# Patient Record
Sex: FEMALE | Race: WHITE | Employment: UNEMPLOYED | URBAN - METROPOLITAN AREA
[De-identification: names, ages, dates, MRNs, and addresses within clinical notes are randomized per-mention and may not be internally consistent; named-entity substitution may affect disease eponyms.]

---

## 2019-05-17 NOTE — PROGRESS NOTES
Patient not reached. Preop instructions left on voice mail. Number___[phone]______      DATE__6/11/19______ TIME__0830______ARRIVAL___FEC  0700______      Nothing to eat or drink after midnight the night before,except for what the prep instructions call for. If you do not have the instructions or do not understand them please contact your doctors office. Follow any instructions your doctors office has given you including what medications to take the AM of your procedure and which ones to hold. You may use your inhalers. If you take a long acting insulin the ivis prior please cut the dose in half and take no diabetic medications that AM.Follow specific doctors office instructions regarding blood thinners and if they want you to hold and for how long. If you are on a blood thinner and have no instructions please contact the office and ask. Dress comfortably,bring your insurance card,picture ID,and a complete list of medications, including supplements. You must have a responsible adult to stay with you during the procedure,drive you home and stay with you. [facility] phone number [phone] for any questions.

## 2019-05-20 ENCOUNTER — ANESTHESIA EVENT (OUTPATIENT)
Dept: ENDOSCOPY | Age: 64
End: 2019-05-20
Payer: COMMERCIAL

## 2019-06-11 ENCOUNTER — HOSPITAL ENCOUNTER (OUTPATIENT)
Age: 64
Setting detail: OUTPATIENT SURGERY
Discharge: HOME | End: 2019-06-11
Attending: INTERNAL MEDICINE | Admitting: INTERNAL MEDICINE
Payer: COMMERCIAL

## 2019-06-11 ENCOUNTER — ANESTHESIA (OUTPATIENT)
Dept: ENDOSCOPY | Age: 64
End: 2019-06-11
Payer: COMMERCIAL

## 2019-06-11 VITALS
OXYGEN SATURATION: 100 % | WEIGHT: 143 LBS | BODY MASS INDEX: 22.98 KG/M2 | SYSTOLIC BLOOD PRESSURE: 145 MMHG | HEIGHT: 66 IN | DIASTOLIC BLOOD PRESSURE: 87 MMHG | HEART RATE: 70 BPM | TEMPERATURE: 97.6 F | RESPIRATION RATE: 16 BRPM

## 2019-06-11 VITALS — DIASTOLIC BLOOD PRESSURE: 88 MMHG | OXYGEN SATURATION: 98 % | SYSTOLIC BLOOD PRESSURE: 117 MMHG

## 2019-06-11 PROCEDURE — 3700000000 HC ANESTHESIA ATTENDED CARE: Performed by: INTERNAL MEDICINE

## 2019-06-11 PROCEDURE — 7100000011 HC PHASE II RECOVERY - ADDTL 15 MIN: Performed by: INTERNAL MEDICINE

## 2019-06-11 PROCEDURE — 2500000003 HC RX 250 WO HCPCS: Performed by: INTERNAL MEDICINE

## 2019-06-11 PROCEDURE — 2580000003 HC RX 258: Performed by: ANESTHESIOLOGY

## 2019-06-11 PROCEDURE — 6360000002 HC RX W HCPCS: Performed by: NURSE ANESTHETIST, CERTIFIED REGISTERED

## 2019-06-11 PROCEDURE — 3609012400 HC EGD TRANSORAL BIOPSY SINGLE/MULTIPLE: Performed by: INTERNAL MEDICINE

## 2019-06-11 PROCEDURE — 3609010600 HC COLONOSCOPY POLYPECTOMY SNARE/COLD BIOPSY: Performed by: INTERNAL MEDICINE

## 2019-06-11 PROCEDURE — 3700000001 HC ADD 15 MINUTES (ANESTHESIA): Performed by: INTERNAL MEDICINE

## 2019-06-11 PROCEDURE — 7100000010 HC PHASE II RECOVERY - FIRST 15 MIN: Performed by: INTERNAL MEDICINE

## 2019-06-11 PROCEDURE — 2500000003 HC RX 250 WO HCPCS: Performed by: NURSE ANESTHETIST, CERTIFIED REGISTERED

## 2019-06-11 PROCEDURE — 6370000000 HC RX 637 (ALT 250 FOR IP): Performed by: INTERNAL MEDICINE

## 2019-06-11 PROCEDURE — 2709999900 HC NON-CHARGEABLE SUPPLY: Performed by: INTERNAL MEDICINE

## 2019-06-11 RX ORDER — PROPOFOL 10 MG/ML
INJECTION, EMULSION INTRAVENOUS CONTINUOUS PRN
Status: DISCONTINUED | OUTPATIENT
Start: 2019-06-11 | End: 2019-06-11 | Stop reason: SDUPTHER

## 2019-06-11 RX ORDER — SODIUM CHLORIDE 9 MG/ML
INJECTION, SOLUTION INTRAVENOUS CONTINUOUS
Status: DISCONTINUED | OUTPATIENT
Start: 2019-06-11 | End: 2019-06-11 | Stop reason: HOSPADM

## 2019-06-11 RX ORDER — LIDOCAINE HYDROCHLORIDE 20 MG/ML
INJECTION, SOLUTION INFILTRATION; PERINEURAL PRN
Status: DISCONTINUED | OUTPATIENT
Start: 2019-06-11 | End: 2019-06-11 | Stop reason: SDUPTHER

## 2019-06-11 RX ORDER — SODIUM CHLORIDE 0.9 % (FLUSH) 0.9 %
10 SYRINGE (ML) INJECTION PRN
Status: DISCONTINUED | OUTPATIENT
Start: 2019-06-11 | End: 2019-06-11 | Stop reason: HOSPADM

## 2019-06-11 RX ORDER — LIDOCAINE HYDROCHLORIDE 10 MG/ML
1 INJECTION, SOLUTION EPIDURAL; INFILTRATION; INTRACAUDAL; PERINEURAL
Status: DISCONTINUED | OUTPATIENT
Start: 2019-06-11 | End: 2019-06-11 | Stop reason: HOSPADM

## 2019-06-11 RX ORDER — FUROSEMIDE 20 MG/1
20 TABLET ORAL DAILY
COMMUNITY

## 2019-06-11 RX ORDER — SODIUM CHLORIDE 0.9 % (FLUSH) 0.9 %
10 SYRINGE (ML) INJECTION EVERY 12 HOURS SCHEDULED
Status: DISCONTINUED | OUTPATIENT
Start: 2019-06-11 | End: 2019-06-11 | Stop reason: HOSPADM

## 2019-06-11 RX ORDER — PROPOFOL 10 MG/ML
INJECTION, EMULSION INTRAVENOUS PRN
Status: DISCONTINUED | OUTPATIENT
Start: 2019-06-11 | End: 2019-06-11 | Stop reason: SDUPTHER

## 2019-06-11 RX ADMIN — PROPOFOL 30 MG: 10 INJECTION, EMULSION INTRAVENOUS at 09:12

## 2019-06-11 RX ADMIN — SODIUM CHLORIDE: 9 INJECTION, SOLUTION INTRAVENOUS at 07:55

## 2019-06-11 RX ADMIN — LIDOCAINE HYDROCHLORIDE 60 MG: 20 INJECTION, SOLUTION INFILTRATION; PERINEURAL at 09:08

## 2019-06-11 RX ADMIN — LIDOCAINE HYDROCHLORIDE 15 MG: 20 INJECTION, SOLUTION INFILTRATION; PERINEURAL at 09:12

## 2019-06-11 RX ADMIN — PROPOFOL 30 MG: 10 INJECTION, EMULSION INTRAVENOUS at 09:46

## 2019-06-11 RX ADMIN — LIDOCAINE HYDROCHLORIDE 15 MG: 20 INJECTION, SOLUTION INFILTRATION; PERINEURAL at 09:46

## 2019-06-11 RX ADMIN — PROPOFOL 120 MG: 10 INJECTION, EMULSION INTRAVENOUS at 09:08

## 2019-06-11 RX ADMIN — PROPOFOL 120 MCG/KG/MIN: 10 INJECTION, EMULSION INTRAVENOUS at 09:11

## 2019-06-11 ASSESSMENT — PAIN - FUNCTIONAL ASSESSMENT: PAIN_FUNCTIONAL_ASSESSMENT: 0-10

## 2019-06-11 ASSESSMENT — PAIN SCALES - GENERAL: PAINLEVEL_OUTOF10: 0

## 2019-06-11 NOTE — PROGRESS NOTES
Reviewed patient's medical and surgical history in electronic record and with patient at the bedside. All questions regarding procedure answered. Scope number and equipment verified using a two person system. Family in waiting room.     Electronically signed by Cody Lopez RN on 6/11/2019 at 9:05 AM

## 2019-06-11 NOTE — PROGRESS NOTES
Patient discharged via wheelchair to vehicle with her son. Patient stated her son is staying with her today.

## 2019-06-11 NOTE — PROGRESS NOTES
Post-procedure education reviewed with patient and visitor, and they verbalized understanding. Patient voided clear urine 1000 cc. States she feels better.

## 2019-06-11 NOTE — PROGRESS NOTES
CR capsule,   temazepam (RESTORIL) 30 MG capsule,   ziprasidone (GEODON) 40 MG capsule,   selegiline (EMSAM) 9 MG/24HR, Place 1 patch onto the skin daily. 12 mg  daily  lovastatin (MEVACOR) 20 MG tablet, Take 20 mg by mouth nightly. lisinopril (PRINIVIL;ZESTRIL) 40 MG tablet, Take 20 mg by mouth daily. furosemide (LASIX) 40 MG tablet, Take 40 mg by mouth daily   lansoprazole (PREVACID) 30 MG capsule, Take 30 mg by mouth daily. ziprasidone (GEODON) 20 MG capsule, Take 40 mg by mouth nightly   TEMAZEPAM PO, Take  by mouth. Allergies:  Amlodipine besylate and Nsaids    Social History:  Social History     Socioeconomic History    Marital status:      Spouse name: Not on file    Number of children: Not on file    Years of education: Not on file    Highest education level: Not on file   Occupational History    Not on file   Social Needs    Financial resource strain: Not on file    Food insecurity:     Worry: Not on file     Inability: Not on file    Transportation needs:     Medical: Not on file     Non-medical: Not on file   Tobacco Use    Smoking status: Former Smoker     Packs/day: 0.25     Years: 5.00     Pack years: 1.25     Last attempt to quit: 1980     Years since quittin.7    Smokeless tobacco: Never Used   Substance and Sexual Activity    Alcohol use:  Yes     Alcohol/week: 0.0 oz     Comment: occasionally    Drug use: Yes     Types: Marijuana     Comment: last used     Sexual activity: Never   Lifestyle    Physical activity:     Days per week: Not on file     Minutes per session: Not on file    Stress: Not on file   Relationships    Social connections:     Talks on phone: Not on file     Gets together: Not on file     Attends Zoroastrianism service: Not on file     Active member of club or organization: Not on file     Attends meetings of clubs or organizations: Not on file     Relationship status: Not on file    Intimate partner violence:     Fear of current or ex partner: Not on file     Emotionally abused: Not on file     Physically abused: Not on file     Forced sexual activity: Not on file   Other Topics Concern    Not on file   Social History Narrative    Not on file       Family History:      Problem Relation Age of Onset    Depression Mother     Heart Disease Mother    Blair Ruiz / Sadi Mother     Cancer Father     High Blood Pressure Father     Cancer Sister     Depression Sister     Substance Abuse Sister     Diabetes Brother        Vital Signs:  Vitals:    06/11/19 0731   BP: (!) 156/99   Pulse: 68   Resp: 16   Temp: 97.7 °F (36.5 °C)   SpO2: 100%

## 2019-06-11 NOTE — ANESTHESIA PRE PROCEDURE
Department of Anesthesiology  Preprocedure Note       Name:  Yas Herzog   Age:  66 y.o.  :  1955                                          MRN:  [id number]         Date:  2019      Surgeon: Romana Fraga):  Ginna Carpio MD    Procedure: COLONOSCOPY (N/A )  EGD (N/A Abdomen)    Medications prior to admission:   Prior to Admission medications    Medication Sig Start Date End Date Taking? Authorizing Provider   acetaminophen-codeine (TYLENOL #4) 300-60 MG per tablet  16   Historical Provider, MD   NUVIGIL 150 MG TABS tablet  5/10/16   Historical Provider, MD   ESTRACE VAGINAL 0.1 MG/GM vaginal cream  16   Historical Provider, MD   L-methylfolate Calcium 15 MG TABS  16   Historical Provider, MD   lamoTRIgine (LAMICTAL) 200 MG tablet  5/10/16   Historical Provider, MD   lisinopril (PRINIVIL;ZESTRIL) 20 MG tablet  16   Historical Provider, MD   [address] [place] [address]  16   Historical Provider, MD   potassium chloride (MICRO-K) 10 MEQ CR capsule  16   Historical Provider, MD   propranolol (INDERAL LA) 120 MG CR capsule  16   Historical Provider, MD   EMSAM 12 MG/24HR  16   Historical Provider, MD   temazepam (RESTORIL) 30 MG capsule  5/10/16   Historical Provider, MD   ziprasidone (GEODON) 40 MG capsule  5/10/16   Historical Provider, MD   Ospemifene (OSPHENA) 60 MG TABS Take 1 tablet by mouth daily    Historical Provider, MD   L-Methylfolate-Algae (DEPLIN 15 PO) Take  by mouth. Historical Provider, MD   selegiline (EMSAM) 9 MG/24HR Place 1 patch onto the skin daily. 12 mg  daily    Historical Provider, MD   lovastatin (MEVACOR) 20 MG tablet Take 20 mg by mouth nightly. Historical Provider, MD   lisinopril (PRINIVIL;ZESTRIL) 40 MG tablet Take 20 mg by mouth daily. Historical Provider, MD   furosemide (LASIX) 40 MG tablet Take 40 mg by mouth daily     Historical Provider, MD   propranolol (INDERAL LA) 120 MG CR capsule Take 120 mg by mouth daily. Historical Provider, MD   pramipexole (MIRAPEX) 0.5 MG tablet Take 0.5 mg by mouth nightly     Historical Provider, MD   lansoprazole (PREVACID) 30 MG capsule Take 30 mg by mouth daily. Historical Provider, MD   ziprasidone (GEODON) 20 MG capsule Take 40 mg by mouth nightly     Historical Provider, MD   TEMAZEPAM PO Take  by mouth. Historical Provider, MD       Current medications:    No current facility-administered medications for this encounter. Allergies:     Allergies   Allergen Reactions    Amlodipine Besylate Other (See Comments)     Blistering sores around mouth and in nose  Blistering sores around mouth and in nose    Nsaids      Not allergic just shouldn't take due to CKD       Problem List:    Patient Active Problem List   Diagnosis Code    DDD (degenerative disc disease), lumbosacral M51.37    DDD (degenerative disc disease), thoracolumbar M51.35    Myofascial pain syndrome M79.18    Spondylolisthesis, cervical region M43.12    DDD (degenerative disc disease), cervical M50.30    Left shoulder pain M25.512       Past Medical History:        Diagnosis Date    Hernandez's esophageal ulceration     Cancer ([facility] [address]     right kidney    Chronic kidney disease     DDD (degenerative disc disease), lumbosacral 2011    Depression     Esophageal stricture     Hyperlipidemia     Hypertension     Restless legs syndrome        Past Surgical History:        Procedure Laterality Date    ABDOMEN SURGERY       SECTION      CHOLECYSTECTOMY, LAPAROSCOPIC  9/30/15    laparoscopic cholecystectomy with lysis of adhesions    COSMETIC SURGERY      abdominal plasty    ENDOMETRIAL ABLATION      ENDOSCOPY, COLON, DIAGNOSTIC      EYE SURGERY      EYE SURGERY Bilateral 10/29/2015    Upper and Lower Blephoraplasty    HERNIA REPAIR      surguical hernia after right neph. partial    LIPOSUCTION  10/829/2015    Liposuction abdoment, back and side    PARTIAL NEPHRECTOMY      right second to cancer    UPPER GASTROINTESTINAL ENDOSCOPY         Social History:    Social History     Tobacco Use    Smoking status: Former Smoker     Packs/day: 0.25     Years: 5.00     Pack years: 1.25     Last attempt to quit: 1980     Years since quittin.7    Smokeless tobacco: Never Used   Substance Use Topics    Alcohol use: Yes     Alcohol/week: 0.0 oz     Comment: occasionally                                Counseling given: Not Answered      Vital Signs (Current): There were no vitals filed for this visit. BP Readings from Last 3 Encounters:   16 (!) 85/60   16 132/87   16 132/78       NPO Status:                                                                                 BMI:   Wt Readings from Last 3 Encounters:   16 148 lb (67.1 kg)   16 150 lb (68 kg)   16 144 lb (65.3 kg)     There is no height or weight on file to calculate BMI.    CBC:   Lab Results   Component Value Date    WBC 3.6 2015    RBC 3.37 2015    HGB 11.2 2015    HCT 33.5 2015    MCV 99.4 2015    RDW 13.6 2015     2015       CMP:   Lab Results   Component Value Date     10/13/2016    K 4.8 10/13/2016     10/13/2016    CO2 29 10/13/2016    BUN 26 10/13/2016    CREATININE 1.1 10/13/2016    GFRAA >60 10/13/2016    LABGLOM 51 10/13/2016    GLUCOSE 118 10/13/2016    PROT 7.3 10/13/2016    CALCIUM 9.9 10/13/2016    BILITOT 0.3 10/13/2016    ALKPHOS 125 10/13/2016    AST 21 10/13/2016    ALT 27 10/13/2016       POC Tests: No results for input(s): POCGLU, POCNA, POCK, POCCL, POCBUN, POCHEMO, POCHCT in the last 72 hours.     Coags: No results found for: PROTIME, INR, APTT    HCG (If Applicable): No results found for: PREGTESTUR, PREGSERUM, HCG, HCGQUANT     ABGs: No results found for: PHART, PO2ART, ZVI1QVQ, PZC9YSV, BEART, V5JNMEHF     Type & Screen (If Applicable):  No results found for: LABABO, [facility]    Anesthesia Evaluation  Patient summary reviewed and Nursing notes reviewed  Airway: Mallampati: II  TM distance: >3 FB   Neck ROM: full  Mouth opening: > = 3 FB Dental:          Pulmonary:                              Cardiovascular:  Exercise tolerance: good (>4 METS),   (+) hypertension: moderate,                   Neuro/Psych:   (+) neuromuscular disease:, psychiatric history:depression/anxiety              ROS comment: Restless leg GI/Hepatic/Renal:   (+) PUD,           Endo/Other:                     Abdominal:           Vascular:                                        Anesthesia Plan      MAC     ASA 2       Induction: intravenous. MIPS: Postoperative opioids intended and Prophylactic antiemetics administered. Anesthetic plan and risks discussed with patient. Plan discussed with CRNA.     Attending anesthesiologist reviewed and agrees with Joann Howell MD   6/11/2019

## 2019-06-11 NOTE — ANESTHESIA POSTPROCEDURE EVALUATION
Department of Anesthesiology  Postprocedure Note    Patient: Aman Schaffer  MRN: [id number]  YOB: 1955  Date of evaluation: 6/11/2019  Time:  10:04 AM     Procedure Summary     Date:  06/11/19 Room / Location:  [facility] ENDO 01 / [facility] ENDOSCOPY    Anesthesia Start:  0903 Anesthesia Stop:  9539    Procedures:       COLONOSCOPY POLYPECTOMY SNARE/COLD BIOPSY (N/A )      EGD BIOPSY (N/A Abdomen) Diagnosis:       (Z12.11 - COLON CANCER SCREENING )      (K22.70 - HELM;S ESOPHAGUS)    Surgeon:  Bunny Hathaway MD Responsible Provider:  Leslie Berrios MD    Anesthesia Type:  MAC ASA Status:  2          Anesthesia Type: MAC    Birdie Phase I: Birdie Score: 10    Birdie Phase II: Birdie Score: 10    Last vitals: Reviewed and per EMR flowsheets.        Anesthesia Post Evaluation    Patient location during evaluation: PACU  Patient participation: complete - patient participated  Level of consciousness: awake and awake and alert  Pain score: 2  Airway patency: patent  Nausea & Vomiting: no vomiting  Complications: no  Cardiovascular status: blood pressure returned to baseline  Respiratory status: acceptable  Hydration status: euvolemic

## 2020-01-16 ENCOUNTER — OFFICE VISIT (OUTPATIENT)
Dept: SURGERY | Age: 65
End: 2020-01-16
Payer: COMMERCIAL

## 2020-01-16 VITALS
SYSTOLIC BLOOD PRESSURE: 109 MMHG | HEIGHT: 66 IN | BODY MASS INDEX: 24.59 KG/M2 | WEIGHT: 153 LBS | DIASTOLIC BLOOD PRESSURE: 76 MMHG

## 2020-01-16 PROCEDURE — 99243 OFF/OP CNSLTJ NEW/EST LOW 30: CPT | Performed by: SURGERY

## 2020-01-16 RX ORDER — DOCUSATE SODIUM 100 MG/1
100 CAPSULE, LIQUID FILLED ORAL
COMMUNITY
End: 2021-04-06 | Stop reason: ALTCHOICE

## 2020-01-16 RX ORDER — LAMOTRIGINE 150 MG/1
TABLET ORAL
COMMUNITY
Start: 2020-01-14

## 2020-01-16 RX ORDER — PROPRANOLOL HYDROCHLORIDE 80 MG/1
120 TABLET ORAL
COMMUNITY

## 2020-01-16 ASSESSMENT — ENCOUNTER SYMPTOMS
NAUSEA: 1
BLOOD IN STOOL: 1
BACK PAIN: 1
EYES NEGATIVE: 1
ABDOMINAL PAIN: 1
RESPIRATORY NEGATIVE: 1
VOICE CHANGE: 1
ALLERGIC/IMMUNOLOGIC NEGATIVE: 1
CONSTIPATION: 1
ABDOMINAL DISTENTION: 1

## 2020-01-16 NOTE — PROGRESS NOTES
(PREVACID) 30 MG capsule Take 30 mg by mouth daily. Historical Provider, MD   ziprasidone (GEODON) 20 MG capsule Take 40 mg by mouth nightly     Historical Provider, MD   TEMAZEPAM PO Take  by mouth. Historical Provider, MD        Allergies:  Amlodipine besylate and Nsaids    Social History:    reports that she quit smoking about 39 years ago. She has a 1.25 pack-year smoking history. She has never used smokeless tobacco. She reports current alcohol use. She reports current drug use. Drug: Marijuana. Family History:        Problem Relation Age of Onset    Depression Mother     Heart Disease Mother    [de-identified] / [place] Mother     Cancer Father     High Blood Pressure Father     Cancer Sister     Depression Sister     Substance Abuse Sister     Diabetes Brother        REVIEW OF SYSTEMS:  Review of Systems   Constitutional: Positive for appetite change and fatigue. HENT: Positive for voice change. Eyes: Negative. Respiratory: Negative. Cardiovascular: Negative. Gastrointestinal: Positive for abdominal distention, abdominal pain, blood in stool, constipation and nausea. Endocrine: Negative. Genitourinary: Positive for flank pain. Musculoskeletal: Positive for back pain, neck pain and neck stiffness. Skin: Negative. Allergic/Immunologic: Negative. Neurological: Negative. Hematological: Negative. Psychiatric/Behavioral: Negative.         PHYSICAL EXAM:  VITALS:  Ht 5' 6\" (1.676 m)   Wt 153 lb (69.4 kg)   BMI 24.69 kg/m²   CONSTITUTIONAL:  alert, no apparent distress and normal weight  EYES:  sclera clear  ENT:  normocepalic, without obvious abnormality  NECK:  supple, symmetrical, trachea midline  LUNGS:  clear to auscultation  CARDIOVASCULAR:  regular rate and rhythm   ABDOMEN:  scars noted are healed, including RUQ paramedial incision, normal bowel sounds, soft, non-distended, tenderness noted in the right upper quadrant, soft tissue swelling or bulging present in the area, voluntary guarding absent, no masses palpated, no hepatosplenomegally and hernia possible but not for certain in the RUQ  MUSCULOSKELETAL:  0+ pitting edema lower extremities  NEUROLOGIC:  Mental Status Exam:  Level of Alertness:   awake  Orientation:   person, place, time  SKIN:  no bruising or bleeding, normal skin color, texture, turgor and no redness, warmth, or swelling        Radiology Review:   IMPRESSION:       1.  No hydronephrosis or ureteral stones identified bilaterally.  No   bladder calcifications. 2.  Status post cholecystectomy.  The common bile duct remains ectatic.    The previously noted intrahepatic biliary dilatation appears to be   improved from the previous examination. 3.  No bowel obstruction.  Normal caliber appendix.  No free   intraperitoneal fluid or pneumoperitoneum. Result Narrative   Patient: Karyle Boos Out: 05:34  Exam(s): CT ABDOMEN + PELVIS Without Contrast     EXAM:    CT Abdomen and Pelvis Without Intravenous Contrast    CLINICAL HISTORY:     Flank pain, kidney stone suspected Chart Prepped - [facility]    TECHNIQUE:    Axial computed tomography images of the abdomen and pelvis without   intravenous contrast.  All CT scans at this facility use dose modulation,   iterative reconstruction, and/or weight based dosing when appropriate to   reduce radiation dose to as low as reasonably achievable. COMPARISON:    8/6/2019    FINDINGS:    Lung bases:  Unremarkable.  No mass.  No consolidation.      ABDOMEN:    LiverDarilyn Dues and bile ducts:  The common bile duct remains ectatic.  The   previously noted intrahepatic biliary dilatation appears to be improved   from the previous examination.  Status post cholecystectomy.    Pancreas:  Unremarkable.  No ductal dilation.    Spleen:  Unremarkable.  No splenomegaly.    Adrenals:  Unremarkable.  No mass.    [id number] and ureters:  No hydronephrosis or ureteral stones identified

## 2020-01-23 ENCOUNTER — HOSPITAL ENCOUNTER (OUTPATIENT)
Dept: CT IMAGING | Age: 65
Discharge: HOME | End: 2020-01-23
Payer: COMMERCIAL

## 2020-01-23 LAB
A/G RATIO: 1.4 (ref 1.1–2.2)
ALBUMIN SERPL-MCNC: 4.2 G/DL (ref 3.4–5)
ALP BLD-CCNC: 132 U/L (ref 40–129)
ALT SERPL-CCNC: 12 U/L (ref 10–40)
ANION GAP SERPL CALCULATED.3IONS-SCNC: 9 MMOL/L (ref 3–16)
AST SERPL-CCNC: 19 U/L (ref 15–37)
BASOPHILS ABSOLUTE: 0 K/UL (ref 0–0.2)
BASOPHILS RELATIVE PERCENT: 0.6 %
BILIRUB SERPL-MCNC: 0.3 MG/DL (ref 0–1)
BUN BLDV-MCNC: 24 MG/DL (ref 7–20)
CALCIUM SERPL-MCNC: 9.4 MG/DL (ref 8.3–10.6)
CHLORIDE BLD-SCNC: 105 MMOL/L (ref 99–110)
CO2: 26 MMOL/L (ref 21–32)
CREAT SERPL-MCNC: 1 MG/DL (ref 0.6–1.2)
EOSINOPHILS ABSOLUTE: 0.1 K/UL (ref 0–0.6)
EOSINOPHILS RELATIVE PERCENT: 1.9 %
GFR AFRICAN AMERICAN: >60
GFR NON-AFRICAN AMERICAN: 56
GLOBULIN: 2.9 G/DL
GLUCOSE BLD-MCNC: 95 MG/DL (ref 70–99)
HCT VFR BLD CALC: 37.2 % (ref 36–48)
HEMOGLOBIN: 12.7 G/DL (ref 12–16)
LIPASE: 46 U/L (ref 13–60)
LYMPHOCYTES ABSOLUTE: 1.7 K/UL (ref 1–5.1)
LYMPHOCYTES RELATIVE PERCENT: 38.4 %
MCH RBC QN AUTO: 32.4 PG (ref 26–34)
MCHC RBC AUTO-ENTMCNC: 34 G/DL (ref 31–36)
MCV RBC AUTO: 95.2 FL (ref 80–100)
MONOCYTES ABSOLUTE: 0.4 K/UL (ref 0–1.3)
MONOCYTES RELATIVE PERCENT: 9 %
NEUTROPHILS ABSOLUTE: 2.3 K/UL (ref 1.7–7.7)
NEUTROPHILS RELATIVE PERCENT: 50.1 %
PDW BLD-RTO: 12.4 % (ref 12.4–15.4)
PLATELET # BLD: 157 K/UL (ref 135–450)
PMV BLD AUTO: 8.8 FL (ref 5–10.5)
POTASSIUM SERPL-SCNC: 3.9 MMOL/L (ref 3.5–5.1)
RBC # BLD: 3.91 M/UL (ref 4–5.2)
SODIUM BLD-SCNC: 140 MMOL/L (ref 136–145)
TOTAL PROTEIN: 7.1 G/DL (ref 6.4–8.2)
WBC # BLD: 4.5 K/UL (ref 4–11)

## 2020-01-23 PROCEDURE — 36415 COLL VENOUS BLD VENIPUNCTURE: CPT

## 2020-01-23 PROCEDURE — 85025 COMPLETE CBC W/AUTO DIFF WBC: CPT

## 2020-01-23 PROCEDURE — 6360000004 HC RX CONTRAST MEDICATION: Performed by: SURGERY

## 2020-01-23 PROCEDURE — 83690 ASSAY OF LIPASE: CPT

## 2020-01-23 PROCEDURE — 80053 COMPREHEN METABOLIC PANEL: CPT

## 2020-01-23 PROCEDURE — 74177 CT ABD & PELVIS W/CONTRAST: CPT

## 2020-01-23 RX ADMIN — IOHEXOL 50 ML: 240 INJECTION, SOLUTION INTRATHECAL; INTRAVASCULAR; INTRAVENOUS; ORAL at 13:37

## 2020-01-23 RX ADMIN — IOPAMIDOL 75 ML: 755 INJECTION, SOLUTION INTRAVENOUS at 13:37

## 2020-02-03 ENCOUNTER — TELEPHONE (OUTPATIENT)
Dept: SURGERY | Age: 65
End: 2020-02-03

## 2020-02-03 NOTE — TELEPHONE ENCOUNTER
Pt called back, she is going to have the kidney mass checked first, then she will call us back to schedule Lap ANAY.

## 2021-04-06 ENCOUNTER — OFFICE VISIT (OUTPATIENT)
Dept: SURGERY | Age: 66
End: 2021-04-06
Payer: COMMERCIAL

## 2021-04-06 VITALS — DIASTOLIC BLOOD PRESSURE: 85 MMHG | BODY MASS INDEX: 24.86 KG/M2 | SYSTOLIC BLOOD PRESSURE: 128 MMHG | WEIGHT: 154 LBS

## 2021-04-06 DIAGNOSIS — R10.9 RIGHT LATERAL ABDOMINAL PAIN: Primary | ICD-10-CM

## 2021-04-06 DIAGNOSIS — R22.2 ABDOMINAL WALL MASS: ICD-10-CM

## 2021-04-06 PROCEDURE — 99212 OFFICE O/P EST SF 10 MIN: CPT | Performed by: SURGERY

## 2021-04-06 RX ORDER — PRAMIPEXOLE DIHYDROCHLORIDE 0.5 MG/1
1 TABLET ORAL NIGHTLY
COMMUNITY
Start: 2020-07-08

## 2021-04-06 RX ORDER — DOXAZOSIN MESYLATE 1 MG/1
1 TABLET ORAL
COMMUNITY
Start: 2020-11-30

## 2021-04-06 NOTE — PROGRESS NOTES
[facility] GENERAL AND LAPAROSCOPIC SURGERY          PATIENT NAME: Wenceslao Higgins     TODAY'S DATE: 4/6/2021    SUBJECTIVE:  CC abd bulge  Pt with right abd possible bulge, tender, possible hernia, possible mass, tender focally, moderate, more with pressure in the area. No skin color change or drainage.     OBJECTIVE:  VITALS:  Wt 154 lb (69.9 kg)   BMI 24.86 kg/m²     CONSTITUTIONAL:  awake and alert  LUNGS:  clear to auscultation  HEART: RRR  ABDOMEN:  normal bowel sounds, soft, non-distended, non-tender     Data:    Radiology Review:  CT 2020      ASSESSMENT AND PLAN:  Right abd mass / bulge, hernia possible  Jaron CT and consider tx options, pt call / return thereafter  Had considered ANAY, no hernia a little over a year ago      Selam Smith

## (undated) DEVICE — MOUTHPIECE ENDOSCP L CTRL OPN AND SIDE PORTS DISP

## (undated) DEVICE — BW-412T DISP COMBO CLEANING BRUSH: Brand: SINGLE USE COMBINATION CLEANING BRUSH

## (undated) DEVICE — TRAP SPEC RETRV CLR PLAS POLYP IN LN SUCT QUIK CTCH

## (undated) DEVICE — Device: Brand: DISPOSABLE ELECTROSURGICAL SNARE

## (undated) DEVICE — ELECTRODE PT RET AD L9FT HI MOIST COND ADH HYDRGEL CORDED

## (undated) DEVICE — SET VLV 3 PC AWS DISPOSABLE GRDIAN SCOPEVALET

## (undated) DEVICE — SOLUTION IV IRRIG WATER 500ML POUR BRL ST 2F7113

## (undated) DEVICE — FORCEPS BX L240CM WRK CHN 2.8MM STD CAP W/ NDL MIC MESH

## (undated) DEVICE — GOWN AURORA NONREINF LG: Brand: MEDLINE INDUSTRIES, INC.

## (undated) DEVICE — 60 ML SYRINGE,REGULAR TIP: Brand: MONOJECT

## (undated) DEVICE — PROCEDURE KIT ENDOSCP CUST